# Patient Record
Sex: FEMALE | URBAN - METROPOLITAN AREA
[De-identification: names, ages, dates, MRNs, and addresses within clinical notes are randomized per-mention and may not be internally consistent; named-entity substitution may affect disease eponyms.]

---

## 2023-01-12 ENCOUNTER — TELEPHONE (OUTPATIENT)
Dept: ORTHOPEDICS | Facility: CLINIC | Age: 73
End: 2023-01-12
Payer: MEDICARE

## 2023-01-12 NOTE — TELEPHONE ENCOUNTER
----- Message from Karla Mcclellan MA sent at 1/11/2023  4:57 PM CST -----  Contact: [phone]    ----- Message -----  From: Marcia Woods  Sent: 1/11/2023  10:26 AM CST  To: [facility] - [facility]    Pt is calling regarding appt . Please call back at [phone] . Thanks/dj

## 2023-01-12 NOTE — TELEPHONE ENCOUNTER
Returned call to patient and left a message for them to call back to the appointment desk to schedule an appointment.